# Patient Record
Sex: FEMALE | Race: WHITE | URBAN - METROPOLITAN AREA
[De-identification: names, ages, dates, MRNs, and addresses within clinical notes are randomized per-mention and may not be internally consistent; named-entity substitution may affect disease eponyms.]

---

## 2021-09-16 ENCOUNTER — NEW PATIENT EMERGENCY (OUTPATIENT)
Dept: URBAN - METROPOLITAN AREA CLINIC 49 | Facility: CLINIC | Age: 51
End: 2021-09-16

## 2021-09-16 DIAGNOSIS — H57.13: ICD-10-CM

## 2021-09-16 DIAGNOSIS — H43.813: ICD-10-CM

## 2021-09-16 PROCEDURE — 92134 CPTRZ OPH DX IMG PST SGM RTA: CPT

## 2021-09-16 PROCEDURE — 76514 ECHO EXAM OF EYE THICKNESS: CPT

## 2021-09-16 PROCEDURE — 92004 COMPRE OPH EXAM NEW PT 1/>: CPT

## 2021-09-16 ASSESSMENT — TONOMETRY
OS_IOP_MMHG: 17
OD_IOP_MMHG: 21
OD_IOP_MMHG: 17
OS_IOP_MMHG: 21

## 2021-09-16 ASSESSMENT — VISUAL ACUITY
OU_CC: J1+
OD_SC: 20/20
OS_SC: 20/20

## 2021-09-16 NOTE — PATIENT DISCUSSION
Discussed with patient that she is having migraine symptoms.  recommend patient get exedrin migraine.  and take it as soon as she has symtpoms.  Recommend patient establish with a neurologist to have them managed.

## 2021-09-16 NOTE — PATIENT DISCUSSION
Discussed with patient that her iop is good today.  Patient states her iop was 30 os at an urgent care.  Explained to patient that her optic nerves are healthy.